# Patient Record
(demographics unavailable — no encounter records)

---

## 2018-06-11 NOTE — ER DOCUMENT REPORT
ED GI/





- General


Chief Complaint: Vag Bleeding, +preg <12wks


Stated Complaint: VAGINAL BLEEDING


Time Seen by Provider: 18 04:19


Notes: 





Patient is a 20-year-old female who is approximately 11 weeks pregnant who 

comes in complaining of spotting and dysuria.  Patient states that she was 

treated for UTI with Macrobid recently and has 3 pills left.  Her symptoms have 

not been improving.  Denies any abdominal pain or cramping.  Patient had an 

ultrasound 2 days ago that showed an intrauterine pregnancy.  Denies any 

vaginal discharge or concern for STDs.  Denies back pain.  She did have some 

nausea.  No vomiting.  No diarrhea.  No other concerns.


TRAVEL OUTSIDE OF THE [place] IN LAST 30 DAYS: No





- HPI


Patient complains to provider of: Dysuria, Vaginal bleeding


Onset: Other


Timing/Duration: Persistent


Quality of pain: Dull


Severity at maximum: Mild


Severity in ED: Mild


: 1


Para: 0


Exacerbated by: Denies


Relieved by: Denies





- Related Data


Allergies/Adverse Reactions: 


 





No Known Allergies Allergy (Verified 18 03:33)


 











Past Medical History





- Social History


Smoking Status: Never Smoker


Lives with: Spouse/Significant other


Family History: Reviewed & Not Pertinent





- Medical History


Medical History: Negative


Surgical Hx: Negative





Review of Systems





- Review of Systems


Constitutional: No symptoms reported


EENT: No symptoms reported


Cardiovascular: No symptoms reported


Respiratory: No symptoms reported


Gastrointestinal: No symptoms reported


Genitourinary: See HPI


Female Genitourinary: See HPI


Musculoskeletal: No symptoms reported


Skin: No symptoms reported


Hematologic/Lymphatic: No symptoms reported


Neurological/Psychological: No symptoms reported





Physical Exam





- Vital signs


Vitals: 





 











Temp Pulse Resp BP Pulse Ox


 


 98.1 F   85   18   117/70   100 


 


 18 03:35  18 03:35  18 03:35  18 03:35  18 03:35











Interpretation: Normal





- General


General appearance: Appears well, Alert





- HEENT


Head: Normocephalic, Atraumatic


Eyes: Normal


Pupils: PERRL





- Respiratory


Respiratory status: No respiratory distress


Chest status: Nontender


Breath sounds: Normal


Chest palpation: Normal





- Cardiovascular


Rhythm: Regular


Heart sounds: Normal auscultation


Murmur: No





- Abdominal


Inspection: Normal


Distension: No distension


Bowel sounds: Normal


Tenderness: Nontender


Organomegaly: No organomegaly





- Back


Back: Normal, Nontender





- Extremities


General upper extremity: Normal inspection, Nontender, Normal color, Normal ROM

, Normal temperature


General lower extremity: Normal inspection, Nontender, Normal color, Normal ROM

, Normal temperature, Normal weight bearing.  No: Deborah's sign





- Neurological


Neuro grossly intact: Yes


Cognition: Normal


Orientation: AAOx4


[place] Coma Scale Eye Opening: Spontaneous


[place] Coma Scale Verbal: Oriented


[place] Coma Scale Motor: Obeys Commands


[place] Coma Scale Total: 15


Speech: Normal


Motor strength normal: LUE, RUE, LLE, RLE


Sensory: Normal





- Psychological


Associated symptoms: Normal affect, Normal mood





- Skin


Skin Temperature: Warm


Skin Moisture: Dry


Skin Color: Normal





Course





- Re-evaluation


Re-evalutation: 





18 05:18


Patient appears well.  Had intrauterine pregnancy on ultrasound.  Blood type is 

AB+ and no RhoGam indicated.  Urinalysis is consistent with patient's symptoms.

  Culture will be sent.  Patient will be given a dose of Rocephin and 

discharged home with Keflex.  Return immediately if any worsening or concerning 

symptoms.  Agrees with this plan.  Stable for discharge home.





- Vital Signs


Vital signs: 





 











Temp Pulse Resp BP Pulse Ox


 


 98.1 F   85   18   117/70   100 


 


 18 03:35  18 03:35  18 03:35  18 03:35  18 03:35














- Laboratory


Result Diagrams: 


 18 04:13





 18 04:13


Laboratory results interpreted by me: 





 











  18





  04:13


 


Urine Ketones  80 H


 


Urine Blood  LARGE H


 


Ur Leukocyte Esterase  LARGE H














Discharge





- Discharge


Clinical Impression: 


 Bleeding in early pregnancy





UTI (urinary tract infection)


Qualifiers:


 Urinary tract infection type: site unspecified Hematuria presence: with 

hematuria Qualified Code(s): N39.0 - Urinary tract infection, site not specified

; R31.9 - Hematuria, unspecified; R31.9 - Hematuria, unspecified





Instructions:  Urinary Tract Infection ([facility]), Bleeding During Early Pregnancy (

[facility])


Additional Instructions: 


Please follow-up with your doctor this week.  A urine culture has been sent and 

you can call for the results at [phone].


Prescriptions: 


Cephalexin Monohydrate [Keflex 500 mg Capsule] 500 mg PO Q6H 7 Days #28 capsule